# Patient Record
Sex: FEMALE | Race: WHITE | NOT HISPANIC OR LATINO | Employment: UNEMPLOYED | URBAN - METROPOLITAN AREA
[De-identification: names, ages, dates, MRNs, and addresses within clinical notes are randomized per-mention and may not be internally consistent; named-entity substitution may affect disease eponyms.]

---

## 2023-07-28 ENCOUNTER — HOSPITAL ENCOUNTER (EMERGENCY)
Facility: HOSPITAL | Age: 28
Discharge: HOME | End: 2023-07-28
Attending: EMERGENCY MEDICINE
Payer: MEDICAID

## 2023-07-28 VITALS
BODY MASS INDEX: 50.31 KG/M2 | RESPIRATION RATE: 18 BRPM | TEMPERATURE: 97.8 F | SYSTOLIC BLOOD PRESSURE: 132 MMHG | OXYGEN SATURATION: 96 % | HEART RATE: 87 BPM | DIASTOLIC BLOOD PRESSURE: 80 MMHG | WEIGHT: 283.95 LBS | HEIGHT: 63 IN

## 2023-07-28 DIAGNOSIS — N89.8 VAGINAL DISCHARGE: Primary | ICD-10-CM

## 2023-07-28 DIAGNOSIS — A59.9 TRICHIMONIASIS: ICD-10-CM

## 2023-07-28 LAB
B-HCG UR QL: NEGATIVE
C TRACH RRNA CVX QL NAA+PROBE: NOT DETECTED
C TRACH RRNA CVX QL NAA+PROBE: NOT DETECTED
CANDIDA SPECIES: NEGATIVE
GARDNERELLA VAGINALIS: NEGATIVE
N GONORRHOEA RRNA SPEC QL NAA+PROBE: NOT DETECTED
N GONORRHOEA RRNA SPEC QL NAA+PROBE: NOT DETECTED
T VAGINALIS DNA VAG QL PROBE+SIG AMP: POSITIVE

## 2023-07-28 PROCEDURE — 99283 EMERGENCY DEPT VISIT LOW MDM: CPT

## 2023-07-28 PROCEDURE — 87591 N.GONORRHOEAE DNA AMP PROB: CPT | Performed by: NURSE PRACTITIONER

## 2023-07-28 PROCEDURE — 25010000002 CEFTRIAXONE PER 250 MG: Performed by: NURSE PRACTITIONER

## 2023-07-28 PROCEDURE — 81025 URINE PREGNANCY TEST: CPT | Performed by: NURSE PRACTITIONER

## 2023-07-28 PROCEDURE — 96372 THER/PROPH/DIAG INJ SC/IM: CPT

## 2023-07-28 PROCEDURE — 87510 GARDNER VAG DNA DIR PROBE: CPT | Performed by: NURSE PRACTITIONER

## 2023-07-28 PROCEDURE — 87480 CANDIDA DNA DIR PROBE: CPT | Performed by: NURSE PRACTITIONER

## 2023-07-28 PROCEDURE — 0 LIDOCAINE 1 % SOLUTION 10 ML VIAL: Performed by: NURSE PRACTITIONER

## 2023-07-28 PROCEDURE — 87491 CHLMYD TRACH DNA AMP PROBE: CPT | Performed by: NURSE PRACTITIONER

## 2023-07-28 PROCEDURE — 87660 TRICHOMONAS VAGIN DIR PROBE: CPT | Performed by: NURSE PRACTITIONER

## 2023-07-28 RX ORDER — TRAZODONE HYDROCHLORIDE 50 MG/1
50 TABLET ORAL NIGHTLY
COMMUNITY
End: 2023-08-03

## 2023-07-28 RX ORDER — METRONIDAZOLE 500 MG/1
500 TABLET ORAL 3 TIMES DAILY
Qty: 21 TABLET | Refills: 0 | Status: ON HOLD | OUTPATIENT
Start: 2023-07-28

## 2023-07-28 RX ADMIN — LIDOCAINE HYDROCHLORIDE 500 MG: 10 INJECTION, SOLUTION INFILTRATION; PERINEURAL at 09:34

## 2023-07-28 NOTE — ED PROVIDER NOTES
Time: 11:29 AM EDT  Date of encounter:  7/28/2023  Independent Historian/Clinical History and Information was obtained by:   Patient    History is limited by: N/A    Chief Complaint: Vaginal discharge      History of Present Illness:  Patient is a 27 y.o. year old female who presents to the emergency department for evaluation of vaginal discharge that has been present for 2 weeks. She reports unprotected intercourse with a new partner about 3 weeks ago. She does not know if that individual had STI's.      History provided by:  Patient    Patient Care Team  Primary Care Provider: Provider, Meli Known    Past Medical History:     No Known Allergies  History reviewed. No pertinent past medical history.  History reviewed. No pertinent surgical history.  History reviewed. No pertinent family history.    Home Medications:  Prior to Admission medications    Medication Sig Start Date End Date Taking? Authorizing Provider   traZODone (DESYREL) 50 MG tablet Take 1 tablet by mouth Every Night.   Yes Provider, Historical, MD        Social History:   Social History     Tobacco Use    Smoking status: Every Day     Packs/day: 0.50     Years: 10.00     Pack years: 5.00     Types: Cigarettes    Smokeless tobacco: Never   Vaping Use    Vaping Use: Former   Substance Use Topics    Alcohol use: Never         Review of Systems:  Review of Systems   Constitutional:  Negative for chills and fever.   HENT:  Negative for congestion, ear pain and sore throat.    Eyes:  Negative for pain.   Respiratory:  Negative for cough, chest tightness and shortness of breath.    Cardiovascular:  Negative for chest pain.   Gastrointestinal:  Negative for abdominal pain, diarrhea, nausea and vomiting.   Genitourinary:  Positive for vaginal discharge. Negative for flank pain and hematuria.   Musculoskeletal:  Negative for joint swelling.   Skin:  Negative for pallor.   Neurological:  Negative for seizures and headaches.   All other systems reviewed and are  "negative.     Physical Exam:  /80 (BP Location: Left arm, Patient Position: Lying)   Pulse 87   Temp 97.8 °F (36.6 °C) (Oral)   Resp 18   Ht 160 cm (63\")   Wt 129 kg (283 lb 15.2 oz)   SpO2 96%   BMI 50.30 kg/m²     Physical Exam  Vitals and nursing note reviewed.   Constitutional:       General: She is not in acute distress.     Appearance: Normal appearance. She is not toxic-appearing.   HENT:      Head: Normocephalic and atraumatic.      Mouth/Throat:      Mouth: Mucous membranes are moist.   Eyes:      General: No scleral icterus.  Cardiovascular:      Rate and Rhythm: Normal rate and regular rhythm.      Pulses: Normal pulses.      Heart sounds: Normal heart sounds.   Pulmonary:      Effort: Pulmonary effort is normal. No respiratory distress.      Breath sounds: Normal breath sounds.   Abdominal:      General: Abdomen is flat.      Palpations: Abdomen is soft.      Tenderness: There is no abdominal tenderness.   Musculoskeletal:         General: Normal range of motion.      Cervical back: Normal range of motion and neck supple.   Skin:     General: Skin is warm and dry.   Neurological:      Mental Status: She is alert and oriented to person, place, and time. Mental status is at baseline.                Procedures:  Procedures      Medical Decision Making:      Comorbidities that affect care:        External Notes reviewed:          The following orders were placed and all results were independently analyzed by me:  Orders Placed This Encounter   Procedures    Chlamydia trachomatis, Neisseria gonorrhoeae, PCR - Urine, Urine, Random Void    Trichomonas vaginalis, PCR - Urine, Urine, Clean Catch    Gardnerella vaginalis, Trichomonas vaginalis, Candida albicans, DNA - Swab, Vagina    Chlamydia trachomatis, Neisseria gonorrhoeae, PCR - Swab, Cervix    Pregnancy, Urine - Urine, Clean Catch    Urinalysis With Microscopic If Indicated (No Culture) - Urine, Clean Catch       Medications Given in the " Emergency Department:  Medications   cefTRIAXone (ROCEPHIN) 250 mg/ml in lidocaine 1% IM syringe (500 mg vial) (500 mg Intramuscular Given 7/28/23 0934)        ED Course:         Labs:    Lab Results (last 24 hours)       Procedure Component Value Units Date/Time    Trichomonas vaginalis, PCR - Urine, Urine, Clean Catch [[id number]] Updated: 07/28/23 1206    Specimen: Urine, Clean Catch     Urinalysis With Microscopic If Indicated (No Culture) - Urine, Clean Catch [[id number]] Updated: 07/28/23 1205    Specimen: Urine, Clean Catch     Pregnancy, Urine - Urine, Clean Catch [[id number]]  (Normal) Collected: 07/28/23 0916    Specimen: Urine, Clean Catch Updated: 07/28/23 0954     HCG, Urine QL Negative    Narrative:      Sensitive immunoassays may demonstrate false positive results  with specimens containing heterophilic antibodies. Assays may  also exhibit false-positive or false-negative results with  specimens containing human anti-mouse antibodies. These   specimens may come from patients receving preparations of  mouse monoclonal antibodies for diagnosis or therapy or having  been exposed to mice. If the qualitative interpretation is  inconsistent with the clinical evaluation, results should be   confirmed by an alternate hCG method, ie. quantitative hCG.  As with all urine pregnancy test, this test does not reliably  detect hCG degradation products, including free-beta hCG and  beta core fragments.    Chlamydia trachomatis, Neisseria gonorrhoeae, PCR - Urine, Urine, Random Void [[id number]]  (Normal) Collected: 07/28/23 1027    Specimen: Urine, Random Void Updated: 07/28/23 1212     Chlamydia DNA by PCR Not Detected     Neisseria gonorrhoeae by PCR Not Detected    Gardnerella vaginalis, Trichomonas vaginalis, Candida albicans, DNA - Swab, Vagina [[id number]]  (Abnormal) Collected: 07/28/23 1246    Specimen: Swab from Vagina Updated: 07/28/23 1401     GARDNERELLA VAGINALIS Negative     TRICHOMONAS VAGINALIS Positive      CANDIDA SPECIES Negative    Chlamydia trachomatis, Neisseria gonorrhoeae, PCR - Swab, Cervix [[id number]]  (Normal) Collected: 07/28/23 1246    Specimen: Swab from Cervix Updated: 07/28/23 1431     Chlamydia DNA by PCR Not Detected     Neisseria gonorrhoeae by PCR Not Detected             Imaging:    No Radiology Exams Resulted Within Past 24 Hours      Differential Diagnosis and Discussion:    Vaginal Discharge: Differential diagnosis includes but is not limited to bacterial vaginosis, candidiasis, trichomonas vaginitis, cervicitis, rectovaginal fistula, irritants and allergens, foreign body, pelvic inflammatory disease.    All labs were reviewed and interpreted by me.    MDM  Number of Diagnoses or Management Options  Trichimoniasis  Vaginal discharge  Diagnosis management comments: Delay of dispo due to first urine specimen was spilled by pt, lab had just enough to perform urine pregnancy. Pt did not want to self swab initially. 2nd urine sample was sent and nurse reports lab error occurred processing the sample. Swab ordered then. Due to pt symptoms and recent unprotected sex a Rocephin injection was ordered prior to results and pt was going to be discharged prior to test results, pt reports she prefers to wait for results prior to discharge.        Amount and/or Complexity of Data Reviewed  Clinical lab tests: reviewed             Patient Care Considerations:          Consultants/Shared Management Plan:    None    Social Determinants of Health:    Patient is independent, reliable, and has access to care.       Disposition and Care Coordination:    Discharged: The patient is suitable and stable for discharge with no need for consideration of observation or admission.    I have explained the patient´s condition, diagnoses and treatment plan based on the information available to me at this time. I have answered questions and addressed any concerns. The patient has a good  understanding of the patient´s diagnosis,  condition, and treatment plan as can be expected at this point. The vital signs have been stable. The patient´s condition is stable and appropriate for discharge from the emergency department.      The patient will pursue further outpatient evaluation with the primary care physician or other designated or consulting physician as outlined in the discharge instructions. They are agreeable to this plan of care and follow-up instructions have been explained in detail. The patient has received these instructions in written format and have expressed an understanding of the discharge instructions. The patient is aware that any significant change in condition or worsening of symptoms should prompt an immediate return to this or the closest emergency department or call to 911.    Final diagnoses:   Vaginal discharge   Trichimoniasis        ED Disposition       ED Disposition   Discharge    Condition   Stable    Comment   --               This medical record created using voice recognition software.             Adiel Ayala, APRN  07/28/23 1508

## 2023-08-03 ENCOUNTER — HOSPITAL ENCOUNTER (INPATIENT)
Facility: HOSPITAL | Age: 28
LOS: 5 days | Discharge: HOME | DRG: 885 | End: 2023-08-08
Attending: PSYCHIATRY & NEUROLOGY | Admitting: PSYCHIATRY & NEUROLOGY
Payer: MEDICAID

## 2023-08-03 ENCOUNTER — HOSPITAL ENCOUNTER (EMERGENCY)
Facility: HOSPITAL | Age: 28
Discharge: PSYCHIATRIC OR SUBSTANCE USE FACILITY | DRG: 885 | End: 2023-08-03
Attending: EMERGENCY MEDICINE
Payer: MEDICAID

## 2023-08-03 VITALS
BODY MASS INDEX: 48.18 KG/M2 | TEMPERATURE: 98.4 F | DIASTOLIC BLOOD PRESSURE: 85 MMHG | HEART RATE: 94 BPM | OXYGEN SATURATION: 97 % | HEIGHT: 64 IN | WEIGHT: 282.19 LBS | SYSTOLIC BLOOD PRESSURE: 136 MMHG | RESPIRATION RATE: 19 BRPM

## 2023-08-03 DIAGNOSIS — R45.851 SUICIDAL IDEATION: Primary | ICD-10-CM

## 2023-08-03 PROBLEM — F33.1 MAJOR DEPRESSIVE DISORDER, RECURRENT EPISODE, MODERATE: Status: ACTIVE | Noted: 2023-08-03

## 2023-08-03 LAB
ALBUMIN SERPL-MCNC: 3.9 G/DL (ref 3.5–5.2)
ALBUMIN/GLOB SERPL: 1.3 G/DL
ALP SERPL-CCNC: 80 U/L (ref 39–117)
ALT SERPL W P-5'-P-CCNC: 51 U/L (ref 1–33)
AMPHET+METHAMPHET UR QL: NEGATIVE
ANION GAP SERPL CALCULATED.3IONS-SCNC: 12.8 MMOL/L (ref 5–15)
APAP SERPL-MCNC: <5 MCG/ML (ref 0–30)
AST SERPL-CCNC: 34 U/L (ref 1–32)
B-HCG UR QL: NEGATIVE
BARBITURATES UR QL SCN: NEGATIVE
BASOPHILS # BLD AUTO: 0.03 10*3/MM3 (ref 0–0.2)
BASOPHILS NFR BLD AUTO: 0.3 % (ref 0–1.5)
BENZODIAZ UR QL SCN: NEGATIVE
BILIRUB SERPL-MCNC: 0.2 MG/DL (ref 0–1.2)
BUN SERPL-MCNC: 14 MG/DL (ref 6–20)
BUN/CREAT SERPL: 15.4 (ref 7–25)
CALCIUM SPEC-SCNC: 9.9 MG/DL (ref 8.6–10.5)
CANNABINOIDS SERPL QL: NEGATIVE
CHLORIDE SERPL-SCNC: 104 MMOL/L (ref 98–107)
CO2 SERPL-SCNC: 21.2 MMOL/L (ref 22–29)
COCAINE UR QL: NEGATIVE
CREAT SERPL-MCNC: 0.91 MG/DL (ref 0.57–1)
DEPRECATED RDW RBC AUTO: 45.9 FL (ref 37–54)
EGFRCR SERPLBLD CKD-EPI 2021: 88.9 ML/MIN/1.73
EOSINOPHIL # BLD AUTO: 0.09 10*3/MM3 (ref 0–0.4)
EOSINOPHIL NFR BLD AUTO: 1 % (ref 0.3–6.2)
ERYTHROCYTE [DISTWIDTH] IN BLOOD BY AUTOMATED COUNT: 13.3 % (ref 12.3–15.4)
ETHANOL BLD-MCNC: <10 MG/DL (ref 0–10)
ETHANOL UR QL: <0.01 %
FENTANYL UR-MCNC: NEGATIVE NG/ML
GLOBULIN UR ELPH-MCNC: 3.1 GM/DL
GLUCOSE SERPL-MCNC: 130 MG/DL (ref 65–99)
HCT VFR BLD AUTO: 48.8 % (ref 34–46.6)
HGB BLD-MCNC: 14.8 G/DL (ref 12–15.9)
HOLD SPECIMEN: NORMAL
HOLD SPECIMEN: NORMAL
IMM GRANULOCYTES # BLD AUTO: 0.03 10*3/MM3 (ref 0–0.05)
IMM GRANULOCYTES NFR BLD AUTO: 0.3 % (ref 0–0.5)
LYMPHOCYTES # BLD AUTO: 3.16 10*3/MM3 (ref 0.7–3.1)
LYMPHOCYTES NFR BLD AUTO: 34.6 % (ref 19.6–45.3)
MCH RBC QN AUTO: 28.4 PG (ref 26.6–33)
MCHC RBC AUTO-ENTMCNC: 30.3 G/DL (ref 31.5–35.7)
MCV RBC AUTO: 93.7 FL (ref 79–97)
METHADONE UR QL SCN: NEGATIVE
MONOCYTES # BLD AUTO: 0.6 10*3/MM3 (ref 0.1–0.9)
MONOCYTES NFR BLD AUTO: 6.6 % (ref 5–12)
NEUTROPHILS NFR BLD AUTO: 5.21 10*3/MM3 (ref 1.7–7)
NEUTROPHILS NFR BLD AUTO: 57.2 % (ref 42.7–76)
NRBC BLD AUTO-RTO: 0 /100 WBC (ref 0–0.2)
OPIATES UR QL: NEGATIVE
OXYCODONE UR QL SCN: NEGATIVE
PLATELET # BLD AUTO: 258 10*3/MM3 (ref 140–450)
PMV BLD AUTO: 10.1 FL (ref 6–12)
POTASSIUM SERPL-SCNC: 4.4 MMOL/L (ref 3.5–5.2)
PROT SERPL-MCNC: 7 G/DL (ref 6–8.5)
RBC # BLD AUTO: 5.21 10*6/MM3 (ref 3.77–5.28)
SALICYLATES SERPL-MCNC: <0.3 MG/DL
SODIUM SERPL-SCNC: 138 MMOL/L (ref 136–145)
WBC NRBC COR # BLD: 9.12 10*3/MM3 (ref 3.4–10.8)
WHOLE BLOOD HOLD COAG: NORMAL
WHOLE BLOOD HOLD SPECIMEN: NORMAL

## 2023-08-03 PROCEDURE — 99284 EMERGENCY DEPT VISIT MOD MDM: CPT

## 2023-08-03 PROCEDURE — 82077 ASSAY SPEC XCP UR&BREATH IA: CPT | Performed by: EMERGENCY MEDICINE

## 2023-08-03 PROCEDURE — 80053 COMPREHEN METABOLIC PANEL: CPT | Performed by: EMERGENCY MEDICINE

## 2023-08-03 PROCEDURE — 80307 DRUG TEST PRSMV CHEM ANLYZR: CPT | Performed by: EMERGENCY MEDICINE

## 2023-08-03 PROCEDURE — 80179 DRUG ASSAY SALICYLATE: CPT | Performed by: EMERGENCY MEDICINE

## 2023-08-03 PROCEDURE — 81025 URINE PREGNANCY TEST: CPT | Performed by: PSYCHIATRY & NEUROLOGY

## 2023-08-03 PROCEDURE — 80143 DRUG ASSAY ACETAMINOPHEN: CPT | Performed by: EMERGENCY MEDICINE

## 2023-08-03 PROCEDURE — 36415 COLL VENOUS BLD VENIPUNCTURE: CPT

## 2023-08-03 PROCEDURE — 85025 COMPLETE CBC W/AUTO DIFF WBC: CPT | Performed by: EMERGENCY MEDICINE

## 2023-08-03 RX ORDER — HYDROXYZINE PAMOATE 50 MG/1
50 CAPSULE ORAL EVERY 6 HOURS PRN
Status: DISCONTINUED | OUTPATIENT
Start: 2023-08-03 | End: 2023-08-08 | Stop reason: HOSPADM

## 2023-08-03 RX ORDER — RISPERIDONE 2 MG/1
2 TABLET ORAL DAILY
COMMUNITY
Start: 2023-07-28 | End: 2023-08-08 | Stop reason: HOSPADM

## 2023-08-03 RX ORDER — IBUPROFEN 200 MG
400 TABLET ORAL EVERY 6 HOURS PRN
COMMUNITY
End: 2023-08-08 | Stop reason: HOSPADM

## 2023-08-03 RX ORDER — ACETAMINOPHEN 325 MG/1
650 TABLET ORAL EVERY 4 HOURS PRN
Status: DISCONTINUED | OUTPATIENT
Start: 2023-08-03 | End: 2023-08-08 | Stop reason: HOSPADM

## 2023-08-03 RX ORDER — BUSPIRONE HYDROCHLORIDE 10 MG/1
10 TABLET ORAL 3 TIMES DAILY
COMMUNITY
Start: 2023-07-28 | End: 2023-08-08 | Stop reason: HOSPADM

## 2023-08-03 RX ORDER — DIPHENHYDRAMINE HYDROCHLORIDE 50 MG/ML
50 INJECTION INTRAMUSCULAR; INTRAVENOUS EVERY 4 HOURS PRN
Status: DISCONTINUED | OUTPATIENT
Start: 2023-08-03 | End: 2023-08-08 | Stop reason: HOSPADM

## 2023-08-03 RX ORDER — LOPERAMIDE HYDROCHLORIDE 2 MG/1
2 CAPSULE ORAL
Status: DISCONTINUED | OUTPATIENT
Start: 2023-08-03 | End: 2023-08-08 | Stop reason: HOSPADM

## 2023-08-03 RX ORDER — LORAZEPAM 2 MG/1
2 TABLET ORAL EVERY 4 HOURS PRN
Status: DISCONTINUED | OUTPATIENT
Start: 2023-08-03 | End: 2023-08-08 | Stop reason: HOSPADM

## 2023-08-03 RX ORDER — ALUMINA, MAGNESIA, AND SIMETHICONE 2400; 2400; 240 MG/30ML; MG/30ML; MG/30ML
15 SUSPENSION ORAL EVERY 6 HOURS PRN
Status: DISCONTINUED | OUTPATIENT
Start: 2023-08-03 | End: 2023-08-08 | Stop reason: HOSPADM

## 2023-08-03 RX ORDER — PRAZOSIN HYDROCHLORIDE 1 MG/1
1 CAPSULE ORAL NIGHTLY
COMMUNITY
Start: 2023-07-28

## 2023-08-03 RX ORDER — HALOPERIDOL 5 MG/1
5 TABLET ORAL EVERY 4 HOURS PRN
Status: DISCONTINUED | OUTPATIENT
Start: 2023-08-03 | End: 2023-08-08 | Stop reason: HOSPADM

## 2023-08-03 RX ORDER — HALOPERIDOL 5 MG/ML
5 INJECTION INTRAMUSCULAR EVERY 4 HOURS PRN
Status: DISCONTINUED | OUTPATIENT
Start: 2023-08-03 | End: 2023-08-08 | Stop reason: HOSPADM

## 2023-08-03 RX ORDER — LORAZEPAM 2 MG/ML
2 INJECTION INTRAMUSCULAR EVERY 4 HOURS PRN
Status: DISCONTINUED | OUTPATIENT
Start: 2023-08-03 | End: 2023-08-08 | Stop reason: HOSPADM

## 2023-08-03 RX ORDER — NICOTINE 21 MG/24HR
1 PATCH, TRANSDERMAL 24 HOURS TRANSDERMAL DAILY PRN
Status: DISCONTINUED | OUTPATIENT
Start: 2023-08-03 | End: 2023-08-05

## 2023-08-03 RX ORDER — DIPHENHYDRAMINE HCL 50 MG
50 CAPSULE ORAL EVERY 4 HOURS PRN
Status: DISCONTINUED | OUTPATIENT
Start: 2023-08-03 | End: 2023-08-08 | Stop reason: HOSPADM

## 2023-08-03 RX ORDER — TRAZODONE HYDROCHLORIDE 50 MG/1
100 TABLET ORAL NIGHTLY PRN
Status: DISCONTINUED | OUTPATIENT
Start: 2023-08-03 | End: 2023-08-05

## 2023-08-03 RX ORDER — SODIUM CHLORIDE 0.9 % (FLUSH) 0.9 %
10 SYRINGE (ML) INJECTION AS NEEDED
Status: DISCONTINUED | OUTPATIENT
Start: 2023-08-03 | End: 2023-08-03 | Stop reason: HOSPADM

## 2023-08-04 PROBLEM — A59.00 UROGENITAL INFECTION BY TRICHOMONAS VAGINALIS: Status: ACTIVE | Noted: 2023-08-04

## 2023-08-04 PROBLEM — R41.83 BORDERLINE INTELLECTUAL FUNCTIONING: Status: ACTIVE | Noted: 2023-08-04

## 2023-08-04 PROBLEM — E66.01 CLASS 3 SEVERE OBESITY WITH BODY MASS INDEX (BMI) OF 45.0 TO 49.9 IN ADULT: Status: ACTIVE | Noted: 2023-08-04

## 2023-08-04 PROBLEM — F43.10 POST TRAUMATIC STRESS DISORDER (PTSD): Status: ACTIVE | Noted: 2023-08-04

## 2023-08-04 PROBLEM — F15.10 METHAMPHETAMINE ABUSE: Status: ACTIVE | Noted: 2023-08-04

## 2023-08-04 PROBLEM — F10.10 ALCOHOL ABUSE: Status: ACTIVE | Noted: 2023-08-04

## 2023-08-04 RX ORDER — PRAZOSIN HYDROCHLORIDE 1 MG/1
1 CAPSULE ORAL NIGHTLY
Status: DISCONTINUED | OUTPATIENT
Start: 2023-08-04 | End: 2023-08-08 | Stop reason: HOSPADM

## 2023-08-04 RX ORDER — VENLAFAXINE HYDROCHLORIDE 75 MG/1
75 CAPSULE, EXTENDED RELEASE ORAL
Status: DISCONTINUED | OUTPATIENT
Start: 2023-08-04 | End: 2023-08-08 | Stop reason: HOSPADM

## 2023-08-04 RX ORDER — METRONIDAZOLE 500 MG/1
500 TABLET ORAL 3 TIMES DAILY
Status: DISCONTINUED | OUTPATIENT
Start: 2023-08-04 | End: 2023-08-08 | Stop reason: HOSPADM

## 2023-08-04 RX ADMIN — METRONIDAZOLE 500 MG: 500 TABLET ORAL at 10:10

## 2023-08-04 RX ADMIN — NICOTINE 1 PATCH: 21 PATCH, EXTENDED RELEASE TRANSDERMAL at 16:13

## 2023-08-04 RX ADMIN — PRAZOSIN HYDROCHLORIDE 1 MG: 1 CAPSULE ORAL at 20:40

## 2023-08-04 RX ADMIN — METRONIDAZOLE 500 MG: 500 TABLET ORAL at 16:15

## 2023-08-04 RX ADMIN — METRONIDAZOLE 500 MG: 500 TABLET ORAL at 20:40

## 2023-08-04 RX ADMIN — VENLAFAXINE HYDROCHLORIDE 75 MG: 75 CAPSULE, EXTENDED RELEASE ORAL at 10:52
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
## 2023-08-04 NOTE — PLAN OF CARE
Goal Outcome Evaluation:   PATIENT ALERT AND ORIENTED AND COOPERATIVE WITH STAFF. PATIENT COMPLIANT WITH MEDICATIONS. PATIENT DENIES S/I, H/I OR HALLUCINATIONS. PATIENT DENIES ANY ANXIETY OR DEPRESSION . PATIENT HAS BEEN SLEEPING IN BED MOST OF THE DAY. NO INAPPROPRIATE OR AGGRESSIVE BEHAVIOR NOTED. WILL CONTINUE TO MONITOR FOR CHANGES IN MOOD OR BEHAVIOR.

## 2023-08-04 NOTE — SIGNIFICANT NOTE
08/04/23 1448   Plan   Plan [facility] confirmed pt can return to treatment when stabilized.   Patient/Family in Agreement with Plan yes   Plan Comments Contact Vania at [phone] to [facility]   Final Discharge Disposition Code 30 - still a patient
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
## 2023-08-04 NOTE — ED PROVIDER NOTES
Time: 10:15 PM EDT  Date of encounter:  8/3/2023  Independent Historian/Clinical History and Information was obtained by:   Patient    History is limited by: N/A    Chief Complaint: Suicidal ideation      History of Present Illness:  Patient is a 27 y.o. year old female who presents to the emergency department for evaluation of suicidal ideation.  [facility] and presents today for suicidal ideation after other patients there are making fun of her.  She feels like slitting her wrist or overdosing.  Denies any medical complaints and denies any recent attempt to harm herself.    HPI    Patient Care Team  Primary Care Provider: Provider, Meli Known    Past Medical History:     No Known Allergies  Past Medical History:   Diagnosis Date    Anxiety     Borderline personality disorder     Depression     Fibromyalgia     Obesity     PTSD (post-traumatic stress disorder)      History reviewed. No pertinent surgical history.  History reviewed. No pertinent family history.    Home Medications:  Prior to Admission medications    Medication Sig Start Date End Date Taking? Authorizing Provider   busPIRone (BUSPAR) 10 MG tablet Take 1 tablet by mouth 3 (Three) Times a Day. 7/28/23  Yes Yuri Ivan MD   prazosin (MINIPRESS) 1 MG capsule Take 1 capsule by mouth Every Night. 7/28/23  Yes Yuri Ivan MD   risperiDONE (risperDAL) 2 MG tablet Take 1 tablet by mouth Daily. 7/28/23  Yes Yuri Ivan MD   sertraline (ZOLOFT) 50 MG tablet Take 1 tablet by mouth Daily. 7/28/23  Yes Yuri Ivan MD   ibuprofen (ADVIL,MOTRIN) 200 MG tablet Take 2 tablets by mouth Every 6 (Six) Hours As Needed.    ProviderYuri MD   metroNIDAZOLE (FLAGYL) 500 MG tablet Take 1 tablet by mouth 3 (Three) Times a Day. 7/28/23   Adiel Ayala APRN   traZODone (DESYREL) 50 MG tablet Take 1 tablet by mouth Every Night.  8/3/23  Yuri Ivan MD        Social History:   Social  "History     Tobacco Use    Smoking status: Every Day     Packs/day: 0.50     Years: 10.00     Pack years: 5.00     Types: Cigarettes    Smokeless tobacco: Never   Vaping Use    Vaping Use: Former   Substance Use Topics    Alcohol use: Not Currently    Drug use: Not Currently     Types: Methamphetamines         Review of Systems:  Review of Systems   Constitutional:  Negative for chills and fever.   HENT:  Negative for congestion, rhinorrhea and sore throat.    Eyes:  Negative for photophobia.   Respiratory:  Negative for apnea, cough, chest tightness and shortness of breath.    Cardiovascular:  Negative for chest pain and palpitations.   Gastrointestinal:  Negative for abdominal pain, diarrhea, nausea and vomiting.   Endocrine: Negative.    Genitourinary:  Negative for difficulty urinating and dysuria.   Musculoskeletal:  Negative for back pain, joint swelling and myalgias.   Skin:  Negative for color change and wound.   Allergic/Immunologic: Negative.    Neurological:  Negative for seizures and headaches.   Psychiatric/Behavioral: Negative.  Positive for suicidal ideas.    All other systems reviewed and are negative.     Physical Exam:  /85   Pulse 94   Temp 98.4 øF (36.9 øC) (Oral)   Resp 19   Ht 162.6 cm (64\")   Wt 128 kg (282 lb 3 oz)   SpO2 97%   BMI 48.44 kg/mý     Physical Exam  Vitals and nursing note reviewed.   Constitutional:       General: She is awake.      Appearance: Normal appearance.   HENT:      Head: Normocephalic and atraumatic.      Nose: Nose normal.      Mouth/Throat:      Mouth: Mucous membranes are moist.   Eyes:      Extraocular Movements: Extraocular movements intact.      Pupils: Pupils are equal, round, and reactive to light.   Cardiovascular:      Rate and Rhythm: Normal rate and regular rhythm.      Heart sounds: Normal heart sounds.   Pulmonary:      Effort: Pulmonary effort is normal. No respiratory distress.      Breath sounds: Normal breath sounds. No wheezing, rhonchi " or rales.   Abdominal:      General: Bowel sounds are normal.      Palpations: Abdomen is soft.      Tenderness: There is no abdominal tenderness. There is no guarding or rebound.      Comments: No rigidity   Musculoskeletal:         General: No tenderness. Normal range of motion.      Cervical back: Normal range of motion and neck supple.   Skin:     General: Skin is warm and dry.      Coloration: Skin is not jaundiced.   Neurological:      General: No focal deficit present.      Mental Status: She is alert and oriented to person, place, and time. Mental status is at baseline.      Sensory: Sensation is intact.      Motor: Motor function is intact.      Coordination: Coordination is intact.   Psychiatric:         Attention and Perception: Attention and perception normal.         Mood and Affect: Mood and affect normal.         Speech: Speech normal.         Behavior: Behavior normal.         Judgment: Judgment normal.                Procedures:  Procedures      Medical Decision Making:      Comorbidities that affect care:    Psychiatric illness    External Notes reviewed:    None      The following orders were placed and all results were independently analyzed by me:  Orders Placed This Encounter   Procedures    [place] Draw    Comprehensive Metabolic Panel    Acetaminophen Level    Ethanol    Salicylate Level    Urine Drug Screen - Urine, Clean Catch    CBC Auto Differential    NPO Diet NPO Type: Strict NPO    Continuous Pulse Oximetry    Vital Signs    Undress & Gown    Psych / Access to See    IP General Consult (Use specialty-specific consult if known)    Oxygen Therapy- Nasal Cannula; Titrate 1-6 LPM Per SpO2; 90 - 95%    POC Glucose Once    Insert Peripheral IV    Suicide Precautions    CBC & Differential    Green Top (Gel)    Lavender Top    Gold Top - SST    Light Blue Top       Medications Given in the Emergency Department:  Medications   sodium chloride 0.9 % flush 10 mL (has no administration in time  range)        ED Course:         Labs:    Lab Results (last 24 hours)       Procedure Component Value Units Date/Time    CBC & Differential [[id number]]  (Abnormal) Collected: 08/03/23 2001    Specimen: Blood from Arm, Right Updated: 08/03/23 2015    Narrative:      The following orders were created for panel order CBC & Differential.  Procedure                               Abnormality         Status                     ---------                               -----------         ------                     CBC Auto Differential[[id number]]        Abnormal            Final result                 Please view results for these tests on the individual orders.    Comprehensive Metabolic Panel [[id number]]  (Abnormal) Collected: 08/03/23 2001    Specimen: Blood from Arm, Right Updated: 08/03/23 2040     Glucose 130 mg/dL      BUN 14 mg/dL      Creatinine 0.91 mg/dL      Sodium 138 mmol/L      Potassium 4.4 mmol/L      Comment: Slight hemolysis detected by analyzer. Results may be affected.        Chloride 104 mmol/L      CO2 21.2 mmol/L      Calcium 9.9 mg/dL      Total Protein 7.0 g/dL      Albumin 3.9 g/dL      ALT (SGPT) 51 U/L      AST (SGOT) 34 U/L      Comment: Slight hemolysis detected by analyzer. Results may be affected.        Alkaline Phosphatase 80 U/L      Total Bilirubin 0.2 mg/dL      Globulin 3.1 gm/dL      A/G Ratio 1.3 g/dL      BUN/Creatinine Ratio 15.4     Anion Gap 12.8 mmol/L      eGFR 88.9 mL/min/1.73     Narrative:      GFR Normal >60  Chronic Kidney Disease <60  Kidney Failure <15      Acetaminophen Level [[id number]]  (Normal) Collected: 08/03/23 2001    Specimen: Blood from Arm, Right Updated: 08/03/23 2039     Acetaminophen <5.0 mcg/mL     Ethanol [[id number]] Collected: 08/03/23 2001    Specimen: Blood from Arm, Right Updated: 08/03/23 2039     Ethanol <10 mg/dL      Ethanol % <0.010 %     Narrative:      Ethanol (Plasma)  <10 Essentially Negative    Toxic Concentrations            mg/dL    Flushing, slowing of reflexes    Impaired visual activity         Depression of CNS              >100  Possible Coma                  >300       Salicylate Level [[id number]]  (Normal) Collected: 08/03/23 2001    Specimen: Blood from Arm, Right Updated: 08/03/23 2039     Salicylate <0.3 mg/dL     CBC Auto Differential [[id number]]  (Abnormal) Collected: 08/03/23 2001    Specimen: Blood from Arm, Right Updated: 08/03/23 2015     WBC 9.12 10*3/mm3      RBC 5.21 10*6/mm3      Hemoglobin 14.8 g/dL      Hematocrit 48.8 %      MCV 93.7 fL      MCH 28.4 pg      MCHC 30.3 g/dL      RDW 13.3 %      RDW-SD 45.9 fl      MPV 10.1 fL      Platelets 258 10*3/mm3      Neutrophil % 57.2 %      Lymphocyte % 34.6 %      Monocyte % 6.6 %      Eosinophil % 1.0 %      Basophil % 0.3 %      Immature Grans % 0.3 %      Neutrophils, Absolute 5.21 10*3/mm3      Lymphocytes, Absolute 3.16 10*3/mm3      Monocytes, Absolute 0.60 10*3/mm3      Eosinophils, Absolute 0.09 10*3/mm3      Basophils, Absolute 0.03 10*3/mm3      Immature Grans, Absolute 0.03 10*3/mm3      nRBC 0.0 /100 WBC     Urine Drug Screen - Urine, Clean Catch [[id number]]  (Normal) Collected: 08/03/23 2005    Specimen: Urine, Clean Catch Updated: 08/03/23 2034     Amphet/Methamphet, Screen Negative     Barbiturates Screen, Urine Negative     Benzodiazepine Screen, Urine Negative     Cocaine Screen, Urine Negative     Opiate Screen Negative     THC, Screen, Urine Negative     Methadone Screen, Urine Negative     Oxycodone Screen, Urine Negative     Fentanyl, Urine Negative    Narrative:      Negative Thresholds Per Drugs Screened:    Amphetamines                 500 ng/ml  Barbiturates                 200 ng/ml  Benzodiazepines              100 ng/ml  Cocaine                      300 ng/ml  Methadone                    300 ng/ml  Opiates                      300 ng/ml  Oxycodone                    100 ng/ml  THC                           50 ng/ml  Fentanyl                        5 ng/ml      The Normal Value for all drugs tested is negative. This report includes final unconfirmed screening results to be used for medical treatment purposes only. Unconfirmed results must not be used for non-medical purposes such as employment or legal testing. Clinical consideration should be applied to any drug of abuse test, particularly when unconfirmed results are used.            Pregnancy, Urine - Urine, Clean Catch [[id number]]  (Normal) Collected: 08/03/23 2005    Specimen: Urine, Clean Catch Updated: 08/03/23 2144     HCG, Urine QL Negative    Narrative:      Sensitive immunoassays may demonstrate false positive results  with specimens containing heterophilic antibodies. Assays may  also exhibit false-positive or false-negative results with  specimens containing human anti-mouse antibodies. These   specimens may come from patients receving preparations of  mouse monoclonal antibodies for diagnosis or therapy or having  been exposed to mice. If the qualitative interpretation is  inconsistent with the clinical evaluation, results should be   confirmed by an alternate hCG method, ie. quantitative hCG.  As with all urine pregnancy test, this test does not reliably  detect hCG degradation products, including free-beta hCG and  beta core fragments.             Imaging:    No Radiology Exams Resulted Within Past 24 Hours      Differential Diagnosis and Discussion:    Psychiatric: Differential diagnosis includes but is not limited to depression, psychosis, bipolar disorder, anxiety, manic episode, schizophrenia, and substance abuse.    All labs were reviewed and interpreted by me.    [facility]           Patient Care Considerations:          Consultants/Shared Management Plan:    Hospitalist: I have discussed the case with psychiatry on-call, Dr. Amaya who agrees to accept the patient for admission.    Social Determinants of Health:    Patient is independent, reliable, and has access to care.        Disposition and Care Coordination:    Psychiatric Admission: Through independent evaluation of the patient's history and physical and consultation with psychiatry, the patient meets criteria for admission to a psychiatric facility.        Final diagnoses:   Suicidal ideation        ED Disposition       ED Disposition   DC/Transfer to Behavioral Health    Condition   Stable    Comment   --               This medical record created using voice recognition software.             Moise Jordan MD  08/03/23 6225

## 2023-08-04 NOTE — SIGNIFICANT NOTE
08/04/23 1247   Plan   Plan Contact with [facility] to confirm pt readmission to treatment. Pending confirmation   Patient/Family in Agreement with Plan yes   Final Discharge Disposition Code 30 - still a patient

## 2023-08-05 RX ORDER — POLYETHYLENE GLYCOL 3350 17 G
2 POWDER IN PACKET (EA) ORAL
Status: DISCONTINUED | OUTPATIENT
Start: 2023-08-05 | End: 2023-08-08 | Stop reason: HOSPADM

## 2023-08-05 RX ADMIN — NICOTINE POLACRILEX 2 MG: 2 LOZENGE ORAL at 21:07

## 2023-08-05 RX ADMIN — METRONIDAZOLE 500 MG: 500 TABLET ORAL at 16:05

## 2023-08-05 RX ADMIN — HYDROXYZINE PAMOATE 75 MG: 25 CAPSULE ORAL at 21:06

## 2023-08-05 RX ADMIN — VENLAFAXINE HYDROCHLORIDE 75 MG: 75 CAPSULE, EXTENDED RELEASE ORAL at 09:02

## 2023-08-05 RX ADMIN — NICOTINE POLACRILEX 2 MG: 2 LOZENGE ORAL at 17:58

## 2023-08-05 RX ADMIN — METRONIDAZOLE 500 MG: 500 TABLET ORAL at 21:06

## 2023-08-05 RX ADMIN — ALUMINUM HYDROXIDE, MAGNESIUM HYDROXIDE, AND DIMETHICONE 15 ML: 400; 400; 40 SUSPENSION ORAL at 23:35

## 2023-08-05 RX ADMIN — PRAZOSIN HYDROCHLORIDE 1 MG: 1 CAPSULE ORAL at 21:07

## 2023-08-05 RX ADMIN — METRONIDAZOLE 500 MG: 500 TABLET ORAL at 09:02

## 2023-08-05 NOTE — PROGRESS NOTES
" [facility]     Psychiatric Progress Note    Patient Name: Cristina Estevez  : 1995  MRN: [id number]  Primary Care Physician:  Provider, No Known  Date of admission: 8/3/2023    Subjective   Subjective     Patient seen and chart reviewed, discussed with staff.    Chief Complaint: Depression, suicidal ideations      HPI:     Staff reports the patient was withdrawn and in bed all day yesterday.  She apparently was asking for assistance going to the restroom, and was acting somewhat helpless and needy.  Rated her depression as an 8.  Staff reports that she slept well.  She is denying suicidal homicidal ideation.    Patient today tells me that she is feeling tired.  She reports that she had decreased sleep last night, but admits that she \"slept almost all day yesterday.\"  Discussed that the time she is spending in bed will interfere with sleep at night.  Encouraged her to get up and be out and about the milieu.  Patient then goes on to ask about sleep medicine and states that she cannot take trazodone because it keeps her awake at night.  She did not require any medicines for sleep last night and did not take the trazodone.  Patient tells me she has no suicidal ideations today and when asked what is different or better if she responds, \"I am just not thinking about it.\"      Objective   Objective     Vitals:   Temp:  [98.1 øF (36.7 øC)-98.6 øF (37 øC)] 98.6 øF (37 øC)  Heart Rate:  [] 99  Resp:  [18-20] 18  BP: (101-132)/() 101/68          Mental Status Exam:      Appearance:   Well-developed, well-nourished, bed, isolative  Reliability:   Limited  Eye Contact:   Limited  Concentration/Focus:    Attentive to the interview but participates minimally  Behaviors:    No restlessness or agitation  Memory :    Intact  Speech:    Normal rate and volume  Language:   Appropriate, relevant  Mood :    \"Okay\"  Affect:    Blunted  Thought process:    Goal directed, no paranoia or delusions elicited  Thought " Content:    Denies suicidal ideations this morning, no homicidal ideations, no auditory visualizations  Insight:   Limited  Judgement:    Impaired, staying in bed all day, withdrawn, but no behavioral disturbance      Result Review    Result Review:  I have personally reviewed the results from the time of this admission to 8/5/2023 12:03 EDT and agree with these findings:  []  Laboratory  []  Microbiology  []  Radiology  []  EKG/Telemetry   []  Cardiology/Vascular   []  Pathology  []  Old records  []  Other:  Most notable findings include:     Lab Results (last 24 hours)       ** No results found for the last 24 hours. **                Medications:   metroNIDAZOLE, 500 mg, Oral, TID  prazosin, 1 mg, Oral, Nightly  venlafaxine XR, 75 mg, Oral, Daily With Breakfast          Assessment / Plan       Active Hospital Problems:  Active Hospital Problems    Diagnosis     **Major depressive disorder, recurrent episode, moderate     Post traumatic stress disorder (PTSD)     Alcohol abuse     Methamphetamine abuse     Class 3 severe obesity with body mass index (BMI) of 45.0 to 49.9 in adult     Borderline intellectual functioning     Urogenital infection by trichomonas vaginalis        Plan:     Continue current treatment protocol and titrate medications as clinically indicated   work on mood stabilization and abatement of any suicidal ideation or psychosis.  Work on appropriate safety plan  Continue supportive therapy  Patient to engage in all group and individual treatment modalities available on the unit  Obtain collateral information if possible  Titrate medications as clinically indicated  Work on appropriate disposition follow-up including referrals to substance abuse treatment if indicated      Disposition:  I expect patient to be discharged 2 to 3 days.    Part of this note may be an electronic transcription/translation of spoken language to printed text using the Dragon dictation system.         Electronically  signed by Jose Amaya MD, 08/05/23, 12:03 PM EDT.

## 2023-08-05 NOTE — PLAN OF CARE
Goal Outcome Evaluation:  Plan of Care Reviewed With: patient  Patient Agreement with Plan of Care: agrees  PATIENT ALERT AND ORIENTED AND COOPERATIVE WITH MEDICATIONS. PATIENT  DENIES S/I, H/I OR HALLUCINATIONS. PATIENT HAS BEEN OUT OF ROOM MORE TODAY INTERACTING WITH PEERS. NO INAPPROPRIATE OR AGGRESSIVE BEHAVIOR NOTED. WILL CONTINUE TO MONITOR FOR CHANGES IN MOOD OR BEHAVIOR.

## 2023-08-05 NOTE — PLAN OF CARE
Goal Outcome Evaluation:  Plan of Care Reviewed With: patient  Patient Agreement with Plan of Care: agrees      Pt withdrawn to room, polite and pleasant upon approach. Pt BP high, reassessed and given night minipress dose. Pt rated depression 8. Endorsed thoughts of self harm but contracted for safety. Pt denied SI, HI, anxiety and AVH.

## 2023-08-06 RX ORDER — BUSPIRONE HYDROCHLORIDE 10 MG/1
20 TABLET ORAL 2 TIMES DAILY
Status: DISCONTINUED | OUTPATIENT
Start: 2023-08-06 | End: 2023-08-08 | Stop reason: HOSPADM

## 2023-08-06 RX ADMIN — PRAZOSIN HYDROCHLORIDE 1 MG: 1 CAPSULE ORAL at 22:20

## 2023-08-06 RX ADMIN — BUSPIRONE HYDROCHLORIDE 20 MG: 10 TABLET ORAL at 22:20

## 2023-08-06 RX ADMIN — NICOTINE POLACRILEX 2 MG: 2 LOZENGE ORAL at 17:28

## 2023-08-06 RX ADMIN — METRONIDAZOLE 500 MG: 500 TABLET ORAL at 22:20

## 2023-08-06 RX ADMIN — HYDROXYZINE PAMOATE 75 MG: 25 CAPSULE ORAL at 22:27

## 2023-08-06 RX ADMIN — NICOTINE POLACRILEX 2 MG: 2 LOZENGE ORAL at 19:30

## 2023-08-06 RX ADMIN — METRONIDAZOLE 500 MG: 500 TABLET ORAL at 15:38

## 2023-08-06 RX ADMIN — VENLAFAXINE HYDROCHLORIDE 75 MG: 75 CAPSULE, EXTENDED RELEASE ORAL at 08:41

## 2023-08-06 RX ADMIN — METRONIDAZOLE 500 MG: 500 TABLET ORAL at 08:41

## 2023-08-06 RX ADMIN — NICOTINE POLACRILEX 2 MG: 2 LOZENGE ORAL at 22:27

## 2023-08-06 NOTE — PLAN OF CARE
Goal Outcome Evaluation:  Plan of Care Reviewed With: patient  Patient Agreement with Plan of Care: agrees      Pt has been up to dayroom, makes needs known. Pt rated anxiety 9, denied depression, avh, si, hi. Med compliant. Mood stable. Pt mentioned that she would be talking to provider about getting put back on Buspar.

## 2023-08-06 NOTE — PROGRESS NOTES
" [facility]     Psychiatric Progress Note    Patient Name: Cristina Estevez  : 1995  MRN: [id number]  Primary Care Physician:  Provider, No Known  Date of admission: 8/3/2023    Subjective   Subjective     Patient seen and chart reviewed, discussed with staff.    Chief Complaint: Depression        HPI:     Staff reports the patient rated her anxiety as a 9.  Denies suicidal or homicidal ideation.  She slept well last night.  She is noted to be somewhat attention seeking continues to act helpless at times.  Has a lack of motivation difficult to engage in care.    Patient today reports that she slept okay.  She reports that her nerves are bad minutes due to the environment on the unit.  She reports that she lets things to get on her nerves including peers behavior.  Patient reports having anxiety.    Patient also states that she has paranoid delusions and thinks that people are out to harm her or poison her and she does not know why she has these thoughts.  She also states that she does not know who would want to do this.  She is denying any suicidal or homicidal ideation.    States that she previously been on Risperdal buspirone and ask about potentially being back on these medications.  She reports the buspirone helped in the past, but she did not think the Risperdal helped.  We will initiate buspirone for her anxiety.      Objective   Objective     Vitals:   Temp:  [97.4 øF (36.3 øC)] 97.4 øF (36.3 øC)  Heart Rate:  [78-82] 78  Resp:  [18] 18  BP: ()/(69-97) 85/69          Mental Status Exam:      Appearance:   Calm, cooperative, withdrawn to her room, participates in exam, no psychomotor restlessness or agitation  Reliability:   Fair  Eye Contact:   Good  Concentration/Focus:    Attentive day interview  Behaviors:    No restlessness or agitation  Memory :    Sensorium intact, no deficits  Speech:    Normal rate and volume  Language:   Appropriate, relevant  Mood :    \"Anxious\"   Affect:    euthymic but " blunted  Thought process:    Linear, goal directed, no thought disorder disorganization noted  Thought Content:    Denies suicidal or homicidal ideation and denies any hallucinations and none evident  Insight:   Limited  Judgement:    Intact, no behavioral disturbance      Result Review    Result Review:  I have personally reviewed the results from the time of this admission to 8/6/2023 14:28 EDT and agree with these findings:  []  Laboratory  []  Microbiology  []  Radiology  []  EKG/Telemetry   []  Cardiology/Vascular   []  Pathology  []  Old records  []  Other:  Most notable findings include:     Lab Results (last 24 hours)       ** No results found for the last 24 hours. **                Medications:   metroNIDAZOLE, 500 mg, Oral, TID  prazosin, 1 mg, Oral, Nightly  venlafaxine XR, 75 mg, Oral, Daily With Breakfast          Assessment / Plan       Active Hospital Problems:  Active Hospital Problems    Diagnosis     **Major depressive disorder, recurrent episode, moderate     Post traumatic stress disorder (PTSD)     Alcohol abuse     Methamphetamine abuse     Class 3 severe obesity with body mass index (BMI) of 45.0 to 49.9 in adult     Borderline intellectual functioning     Urogenital infection by trichomonas vaginalis        Plan:     Initiate buspirone 20 mg twice daily  Continue other meds as ordered and work on mood stabilization  Encourage patient to get up and out the milieu and participate  For back to [facility] when stable  Work on mood stabilization and abatement of any suicidal ideation or psychosis.  Work on appropriate safety plan  Continue supportive therapy  Patient to engage in all group and individual treatment modalities available on the unit  Obtain collateral information if possible  Titrate medications as clinically indicated  Work on appropriate disposition follow-up including referrals to substance abuse treatment if indicated      Disposition:  I expect patient to be discharged 1  to 2 days.    Part of this note may be an electronic transcription/translation of spoken language to printed text using the Dragon dictation system.         Electronically signed by Jose Amaya MD, 08/06/23, 2:28 PM EDT.

## 2023-08-06 NOTE — PLAN OF CARE
Goal Outcome Evaluation:  Plan of Care Reviewed With: patient  Patient Agreement with Plan of Care: agrees   PATIENT ALERT AND ORIENTED AND COMPLIANT WITH MEDICATIONS. PATIENT DENIES S/I, H/I OR HALLUCINATIONS. PATIENT HAS BEEN WITHDRAWN TO HER ROOM ALL DAY SLEEPING. WILL CONTINUE TO MONITOR FOR CHANGES IN MOOD OR BEHAVIOR.

## 2023-08-07 RX ADMIN — NICOTINE POLACRILEX 2 MG: 2 LOZENGE ORAL at 13:18

## 2023-08-07 RX ADMIN — METRONIDAZOLE 500 MG: 500 TABLET ORAL at 08:31

## 2023-08-07 RX ADMIN — BUSPIRONE HYDROCHLORIDE 20 MG: 10 TABLET ORAL at 08:31

## 2023-08-07 RX ADMIN — METRONIDAZOLE 500 MG: 500 TABLET ORAL at 21:00

## 2023-08-07 RX ADMIN — METRONIDAZOLE 500 MG: 500 TABLET ORAL at 16:33

## 2023-08-07 RX ADMIN — BUSPIRONE HYDROCHLORIDE 20 MG: 10 TABLET ORAL at 21:00

## 2023-08-07 RX ADMIN — HYDROXYZINE PAMOATE 50 MG: 50 CAPSULE ORAL at 21:07

## 2023-08-07 RX ADMIN — PRAZOSIN HYDROCHLORIDE 1 MG: 1 CAPSULE ORAL at 21:01

## 2023-08-07 RX ADMIN — VENLAFAXINE HYDROCHLORIDE 75 MG: 75 CAPSULE, EXTENDED RELEASE ORAL at 08:31

## 2023-08-07 NOTE — PLAN OF CARE
Goal Outcome Evaluation:  Plan of Care Reviewed With: patient  Patient Agreement with Plan of Care: agrees           Patient calm and cooperative. Denies SI/HI/AVH. Rates anxiety 8, depression 0. Withdrawn to room sleeping most of the day. Compliant with medications.

## 2023-08-07 NOTE — PLAN OF CARE
Goal Outcome Evaluation:  Plan of Care Reviewed With: patient  Patient Agreement with Plan of Care: agrees         Pt is alert and oriented and able to make needs known.  Pt denies SI and HI.  Pt denies a/v/h.  Pt has been compliant with all meds and assessments tonight.  Pt participated in even group and engages well with staff and peers.  Pt reports that her coping skill is meth and understands that she needs to work on finding better ways to cope in order to be successful in her recovery.  Pt had a brief verbal altercation with peer stating that the peer is the reason she is here.  Pt reports that after her initial 28 days she and said peer are expected to complete the next 6 month period of recovery together and this worries her.  No s/s of acute distress observed at this time.

## 2023-08-07 NOTE — PROGRESS NOTES
" [facility]     Psychiatric Progress Note    Patient Name: Cristina Estevez  : 1995  MRN: [id number]  Primary Care Physician:  Provider, No Known  Date of admission: 8/3/2023    Subjective   Subjective     Patient seen and chart reviewed, discussed with staff.    Chief Complaint: Depression, anxiety      HPI:     Staff reports the patient has remained regressed nicely to her room.  Sleeping most of the day.  She was able to sleep well last night.  Was up out of her room for a short amount of time yesterday.  [facility].    The patient today states that she is tired and when asked why she states she does not know.  She reports that she slept well for the first time since she has been here in the hospital, but she has done nothing but sleep since the time of her admission and not sure how she is deriving her history of poor sleep.  She denies everything day including suicide homicidal ideation.  She has no side effects of medications.  When asked to describe her mood she responds, \"anxious\" from a prone position in her bed lying very calmly with no noticeable restlessness or agitation and has her eyes closed, no tremor, no shakiness to her voice, respirations even unlabored, and in no distress.  As described under the second time she again responded \"anxious\", tell her that that may be a symptom but does not adequately describe her mood and as specifically she is depressed she responds, \"no, I am fine with that.\"      Objective   Objective     Vitals:   Temp:  [98.1 øF (36.7 øC)] 98.1 øF (36.7 øC)  Heart Rate:  [80-93] 93  Resp:  [18] 18  BP: (128-141)/(61-77) 128/61          Mental Status Exam:      Appearance:   Bed, somewhat difficult to engage, appears quite uninterested in treatment  Reliability:   Poor  Eye Contact:   Limited  Concentration/Focus:    Minimally engaging, seems annoyed to have to answer questions  Behaviors:    Withdrawn to her room  Memory :    Intact  Speech:    " Minimal, normal rate and volume  Language:   Appropriate, relevant  Mood :    Describes feeling anxious, but denies depression  Affect:    Euthymic  Thought process:    Goal directed  Thought Content:    Denies suicidal or homicidal ideation, no evidence of hallucinations  Insight:   Limited  Judgement:    Impaired, not engaged in treatment but no behavioral disturbance      Result Review    Result Review:  I have personally reviewed the results from the time of this admission to 8/7/2023 14:52 EDT and agree with these findings:  []  Laboratory  []  Microbiology  []  Radiology  []  EKG/Telemetry   []  Cardiology/Vascular   []  Pathology  []  Old records  []  Other:  Most notable findings include:     Lab Results (last 24 hours)       ** No results found for the last 24 hours. **                Medications:   busPIRone, 20 mg, Oral, BID  metroNIDAZOLE, 500 mg, Oral, TID  prazosin, 1 mg, Oral, Nightly  venlafaxine XR, 75 mg, Oral, Daily With Breakfast          Assessment / Plan       Active Hospital Problems:  Active Hospital Problems    Diagnosis     **Major depressive disorder, recurrent episode, moderate     Post traumatic stress disorder (PTSD)     Alcohol abuse     Methamphetamine abuse     Class 3 severe obesity with body mass index (BMI) of 45.0 to 49.9 in adult     Borderline intellectual functioning     Urogenital infection by trichomonas vaginalis        Plan:     Continue current treatment protocol  Despite discharge tomorrow to [facility] house  Work on mood stabilization and abatement of any suicidal ideation or psychosis.  Work on appropriate safety plan  Continue supportive therapy  Patient to engage in all group and individual treatment modalities available on the unit  Obtain collateral information if possible  Titrate medications as clinically indicated  Work on appropriate disposition follow-up including referrals to substance abuse treatment if indicated      Disposition:  I expect patient to be  discharged tomorrow.    Part of this note may be an electronic transcription/translation of spoken language to printed text using the Dragon dictation system.         Electronically signed by Jose Amaya MD, 08/07/23, 2:52 PM EDT.

## 2023-08-08 VITALS
SYSTOLIC BLOOD PRESSURE: 127 MMHG | RESPIRATION RATE: 20 BRPM | TEMPERATURE: 98.2 F | WEIGHT: 280.43 LBS | OXYGEN SATURATION: 97 % | DIASTOLIC BLOOD PRESSURE: 66 MMHG | HEART RATE: 74 BPM | HEIGHT: 64 IN | BODY MASS INDEX: 47.88 KG/M2

## 2023-08-08 RX ORDER — VENLAFAXINE HYDROCHLORIDE 75 MG/1
75 CAPSULE, EXTENDED RELEASE ORAL
Qty: 30 CAPSULE | Refills: 1 | Status: SHIPPED | OUTPATIENT
Start: 2023-08-09

## 2023-08-08 RX ORDER — BUSPIRONE HYDROCHLORIDE 30 MG/1
30 TABLET ORAL 2 TIMES DAILY
Qty: 60 TABLET | Refills: 1 | Status: SHIPPED | OUTPATIENT
Start: 2023-08-08

## 2023-08-08 RX ADMIN — METRONIDAZOLE 500 MG: 500 TABLET ORAL at 08:47

## 2023-08-08 RX ADMIN — BUSPIRONE HYDROCHLORIDE 20 MG: 10 TABLET ORAL at 08:47

## 2023-08-08 RX ADMIN — VENLAFAXINE HYDROCHLORIDE 75 MG: 75 CAPSULE, EXTENDED RELEASE ORAL at 08:47

## 2023-08-08 NOTE — PLAN OF CARE
Goal Outcome Evaluation:  Plan of Care Reviewed With: patient  Patient Agreement with Plan of Care: agrees     Progress: improving     Patient calm and cooperative. Reports having suicidal thoughts but denies plan or intent. Denies homicidal ideation and a/v hallucinations. Rates anxiety and depression 10. Patient being discharged back to [facility] house today. Safety plan completed by patient prior to discharge.

## 2023-08-08 NOTE — PLAN OF CARE
"Goal Outcome Evaluation:  Plan of Care Reviewed With: patient  Patient Agreement with Plan of Care: agrees  Patient has been withdrawn to her room resting and sleeping in bed. Pt reports she has been in bed most of the day. Reports suicidal ideations,no plan verbalized. Pt ask if she can come to staff should she have thoughts and intent to harm herself and she agreed she can. Pt then stated \" I can't promise that I wont harm myself if I leave here tomorrow\".Pt then went to other nurse on duty and states she is tired of doing rehab and it never working for her.Pt rated depression and anxiety 10/10.Pt given vistaril per her request for anxiety. Treatment plan reviewed and is ongoing                  "

## 2023-08-08 NOTE — DISCHARGE SUMMARY
[facility]         DISCHARGE SUMMARY    Patient Name: Cristina Estevez  : 1995  MRN: [id number]    Date of Admission: 8/3/2023  Date of Discharge: 2023  Primary Care Physician: Provider, No Known    Consults       No orders found from 2023 to 2023.            Presenting Problem:   Major depressive disorder, recurrent episode, moderate [F33.1]    Active and Resolved Hospital Problems:  Active Hospital Problems    Diagnosis POA    **Major depressive disorder, recurrent episode, moderate [F33.1] Yes    Post traumatic stress disorder (PTSD) [F43.10] Yes    Alcohol abuse [F10.10] Yes    Methamphetamine abuse [F15.10] Yes    Class 3 severe obesity with body mass index (BMI) of 45.0 to 49.9 in adult [E66.01, Z68.42] Not Applicable    Borderline intellectual functioning [R41.83] Not Applicable    Urogenital infection by trichomonas vaginalis [A59.00] Yes      Resolved Hospital Problems   No resolved problems to display.         Hospital Course     Hospital Course:  Cristina Estevez is a 27 y.o. female with a history of depression and PTSD as well as substance use that was admitted from the emergency room on a voluntary basis for suicidal ideation.  Patient been at [facility] for treatment of her substance use disorder.  Patient had some conflict with peers at [facility] could not handle this became suicidal.    Patient been treated for depression and anxiety prior to coming into the hospital.  Her medications were not effective and sertraline was discontinued.  She was started on venlafaxine for her depression and has tolerated this medication well with no side effects.  She reports it has helped her mood.    She had been on buspirone and this initially was not reinitiated.  However, she complained of anxiety throughout her stay buspirone was reinitiated at a higher dose, and then titrated up to 30 mg twice daily by time of discharge.  Patient reports medication has helped.    Patient  "has been mostly isolative and regressed to her room throughout her stay.  Patient has been sleeping a lot and is not been fully engaged in treatment process.  She has been a little guarded and difficult to engage, but is future and goal directed.  Her suicidal ideations dissipated.    Patient is not fully engaged in the therapeutic process here on the unit.  She has had medication changes to help with her depression which will take some time to be fully effective.  Patient also needs treatment for her history of significant substance use and is able to return to the [facility] for continued treatment for drugs and alcohol.  Patient said no acute agitation while in the hospital.  At this point she is on a good medication regimen and has an appropriate disposition follow-up for continued sobriety.  The patient is going to be managed as an outpatient is met maximum benefit of hospital and may be discharged home.    On day of discharge she is calm, cooperative, engaging, and has no psychomotor restlessness or agitation.  She is future oriented goal directed.  Speech is articulate, fluent, normal rate and volume, but somewhat limited.  Language is appropriate relevant.  Mood is described as \"guess I am alright\" affect is slightly blunted but euthymic.  Thought processes are sequential and goal directed.  Thought content is negative for suicidal or homicidal ideation or auditory visualizations.  Insight and judgment are limited as she seems very poorly motivated for treatment here and suspect this has been her presentation to [facility].      DISCHARGE Follow Up Recommendations for labs and diagnostics: Primary care as needed for general health maintenance, [facility] for drug and alcohol rehabilitation, [facility]      Day of Discharge     Vital Signs:  Temp:  [98.2 øF (36.8 øC)-98.4 øF (36.9 øC)] 98.2 øF (36.8 øC)  Heart Rate:  [74-91] 74  Resp:  [18-20] 20  BP: (127-132)/(66-82) 127/66      Pertinent "  and/or Most Recent Results     LAB RESULTS:      Lab 08/03/23 2001   WBC 9.12   HEMOGLOBIN 14.8   HEMATOCRIT 48.8*   PLATELETS 258   NEUTROS ABS 5.21   IMMATURE GRANS (ABS) 0.03   LYMPHS ABS 3.16*   MONOS ABS 0.60   EOS ABS 0.09   MCV 93.7         Lab 08/03/23 2001   SODIUM 138   POTASSIUM 4.4   CHLORIDE 104   CO2 21.2*   ANION GAP 12.8   BUN 14   CREATININE 0.91   EGFR 88.9   GLUCOSE 130*   CALCIUM 9.9         Lab 08/03/23 2001   TOTAL PROTEIN 7.0   ALBUMIN 3.9   GLOBULIN 3.1   ALT (SGPT) 51*   AST (SGOT) 34*   BILIRUBIN 0.2   ALK PHOS 80                                     Lab 08/03/23 2001   ETHANOL PCT <0.010   ETHANOL MGDL <10         Lab 08/03/23 2005   AMPH/METHAM SCREEN, URINE Negative   BENZODIAZEPINE SCREEN, URINE Negative   COCAINE SCREEN, URINE Negative   OPIATES Negative   THC URINE SCREEN Negative   METHADONE SCREEN, URINE Negative     Brief Urine Lab Results  (Last result in the past 365 days)        Color   Clarity   Blood   Leuk Est   Nitrite   Protein   CREAT   Urine HCG        08/03/23 2005               Negative                                   Imaging Results (Last 7 Days)       ** No results found for the last 168 hours. **             Labs Pending at Discharge:           Discharge Details        Discharge Medications        New Medications        Instructions Start Date   venlafaxine XR 75 MG 24 hr capsule  Commonly known as: EFFEXOR-XR   75 mg, Oral, Daily With Breakfast   Start Date: August 9, 2023            Changes to Medications        Instructions Start Date   busPIRone 30 MG tablet  Commonly known as: BUSPAR  What changed:   medication strength  how much to take  when to take this   30 mg, Oral, 2 Times Daily             Continue These Medications        Instructions Start Date   metroNIDAZOLE 500 MG tablet  Commonly known as: FLAGYL   500 mg, Oral, 3 Times Daily      prazosin 1 MG capsule  Commonly known as: MINIPRESS   1 mg, Oral, Nightly             Stop These Medications       ibuprofen 200 MG tablet  Commonly known as: ADVIL,MOTRIN     risperiDONE 2 MG tablet  Commonly known as: risperDAL     sertraline 50 MG tablet  Commonly known as: ZOLOFT              No Known Allergies      Discharge Disposition:  Home or Self Care    Diet:  Hospital:  Diet Order   Procedures    Diet: Regular/House Diet; Texture: Regular Texture (IDDSI 7); Fluid Consistency: Thin (IDDSI 0)         Discharge Activity: Ad keaton.  Activity Instructions       Activity as Tolerated              Discharge Condition: Stable    CODE STATUS:  Code Status and Medical Interventions:   Ordered at: 08/03/23 2135     Code Status (Patient has no pulse and is not breathing):    CPR (Attempt to Resuscitate)     Medical Interventions (Patient has pulse or is breathing):    Full Support     Release to patient:    Routine Release         No future appointments.    Additional Instructions for the Follow-ups that You Need to Schedule       Discharge Follow-up with PCP   As directed       Currently Documented PCP:    Provider, No Known    PCP Phone Number:    None     Follow Up Details: As needed        Discharge Follow-up with Specified Provider: [facility]   As directed      To[facility]        Discharge Follow-up with Specified Provider: [facility]   As directed      To: [facility]                Time spent on Discharge including face to face service: 30 minutes    Part of this note may be an electronic transcription/translation of spoken language to printed text using the Dragon dictation system.        Electronically signed by Jose Amaya MD, 08/08/23, 9:52 AM EDT.

## 2023-08-09 ENCOUNTER — HOSPITAL ENCOUNTER (EMERGENCY)
Facility: HOSPITAL | Age: 28
Discharge: OTHER INSTITUTION | End: 2023-08-09
Attending: EMERGENCY MEDICINE
Payer: MEDICAID

## 2023-08-09 VITALS
RESPIRATION RATE: 16 BRPM | HEIGHT: 64 IN | DIASTOLIC BLOOD PRESSURE: 55 MMHG | BODY MASS INDEX: 48.48 KG/M2 | TEMPERATURE: 98 F | HEART RATE: 72 BPM | WEIGHT: 283.95 LBS | OXYGEN SATURATION: 99 % | SYSTOLIC BLOOD PRESSURE: 122 MMHG

## 2023-08-09 DIAGNOSIS — R45.851 DEPRESSION WITH SUICIDAL IDEATION: Primary | ICD-10-CM

## 2023-08-09 DIAGNOSIS — F32.A DEPRESSION WITH SUICIDAL IDEATION: Primary | ICD-10-CM

## 2023-08-09 LAB
ALBUMIN SERPL-MCNC: 3.7 G/DL (ref 3.5–5.2)
ALBUMIN/GLOB SERPL: 1.4 G/DL
ALP SERPL-CCNC: 69 U/L (ref 39–117)
ALT SERPL W P-5'-P-CCNC: 43 U/L (ref 1–33)
AMPHET+METHAMPHET UR QL: NEGATIVE
ANION GAP SERPL CALCULATED.3IONS-SCNC: 12.6 MMOL/L (ref 5–15)
APAP SERPL-MCNC: <5 MCG/ML (ref 0–30)
AST SERPL-CCNC: 30 U/L (ref 1–32)
BARBITURATES UR QL SCN: NEGATIVE
BASOPHILS # BLD AUTO: 0.02 10*3/MM3 (ref 0–0.2)
BASOPHILS NFR BLD AUTO: 0.2 % (ref 0–1.5)
BENZODIAZ UR QL SCN: NEGATIVE
BILIRUB SERPL-MCNC: 0.2 MG/DL (ref 0–1.2)
BUN SERPL-MCNC: 9 MG/DL (ref 6–20)
BUN/CREAT SERPL: 13.8 (ref 7–25)
CALCIUM SPEC-SCNC: 9 MG/DL (ref 8.6–10.5)
CANNABINOIDS SERPL QL: NEGATIVE
CHLORIDE SERPL-SCNC: 106 MMOL/L (ref 98–107)
CO2 SERPL-SCNC: 21.4 MMOL/L (ref 22–29)
COCAINE UR QL: NEGATIVE
CREAT SERPL-MCNC: 0.65 MG/DL (ref 0.57–1)
DEPRECATED RDW RBC AUTO: 42.5 FL (ref 37–54)
EGFRCR SERPLBLD CKD-EPI 2021: 123.9 ML/MIN/1.73
EOSINOPHIL # BLD AUTO: 0.15 10*3/MM3 (ref 0–0.4)
EOSINOPHIL NFR BLD AUTO: 1.5 % (ref 0.3–6.2)
ERYTHROCYTE [DISTWIDTH] IN BLOOD BY AUTOMATED COUNT: 13.7 % (ref 12.3–15.4)
ETHANOL BLD-MCNC: <10 MG/DL (ref 0–10)
ETHANOL UR QL: <0.01 %
FENTANYL UR-MCNC: NEGATIVE NG/ML
GLOBULIN UR ELPH-MCNC: 2.7 GM/DL
GLUCOSE SERPL-MCNC: 107 MG/DL (ref 65–99)
HCT VFR BLD AUTO: 41.5 % (ref 34–46.6)
HGB BLD-MCNC: 14.1 G/DL (ref 12–15.9)
HOLD SPECIMEN: NORMAL
HOLD SPECIMEN: NORMAL
IMM GRANULOCYTES # BLD AUTO: 0.04 10*3/MM3 (ref 0–0.05)
IMM GRANULOCYTES NFR BLD AUTO: 0.4 % (ref 0–0.5)
LYMPHOCYTES # BLD AUTO: 3.49 10*3/MM3 (ref 0.7–3.1)
LYMPHOCYTES NFR BLD AUTO: 35.3 % (ref 19.6–45.3)
MCH RBC QN AUTO: 28.7 PG (ref 26.6–33)
MCHC RBC AUTO-ENTMCNC: 34 G/DL (ref 31.5–35.7)
MCV RBC AUTO: 84.5 FL (ref 79–97)
METHADONE UR QL SCN: NEGATIVE
MONOCYTES # BLD AUTO: 0.83 10*3/MM3 (ref 0.1–0.9)
MONOCYTES NFR BLD AUTO: 8.4 % (ref 5–12)
NEUTROPHILS NFR BLD AUTO: 5.37 10*3/MM3 (ref 1.7–7)
NEUTROPHILS NFR BLD AUTO: 54.2 % (ref 42.7–76)
NRBC BLD AUTO-RTO: 0 /100 WBC (ref 0–0.2)
OPIATES UR QL: NEGATIVE
OXYCODONE UR QL SCN: NEGATIVE
PLATELET # BLD AUTO: 302 10*3/MM3 (ref 140–450)
PMV BLD AUTO: 9.6 FL (ref 6–12)
POTASSIUM SERPL-SCNC: 3.8 MMOL/L (ref 3.5–5.2)
PROT SERPL-MCNC: 6.4 G/DL (ref 6–8.5)
RBC # BLD AUTO: 4.91 10*6/MM3 (ref 3.77–5.28)
SALICYLATES SERPL-MCNC: <0.3 MG/DL
SODIUM SERPL-SCNC: 140 MMOL/L (ref 136–145)
WBC NRBC COR # BLD: 9.9 10*3/MM3 (ref 3.4–10.8)
WHOLE BLOOD HOLD COAG: NORMAL
WHOLE BLOOD HOLD SPECIMEN: NORMAL

## 2023-08-09 PROCEDURE — 80307 DRUG TEST PRSMV CHEM ANLYZR: CPT | Performed by: EMERGENCY MEDICINE

## 2023-08-09 PROCEDURE — 80179 DRUG ASSAY SALICYLATE: CPT

## 2023-08-09 PROCEDURE — 96360 HYDRATION IV INFUSION INIT: CPT

## 2023-08-09 PROCEDURE — 80143 DRUG ASSAY ACETAMINOPHEN: CPT

## 2023-08-09 PROCEDURE — 82077 ASSAY SPEC XCP UR&BREATH IA: CPT

## 2023-08-09 PROCEDURE — 99285 EMERGENCY DEPT VISIT HI MDM: CPT

## 2023-08-09 PROCEDURE — 36415 COLL VENOUS BLD VENIPUNCTURE: CPT

## 2023-08-09 PROCEDURE — 85025 COMPLETE CBC W/AUTO DIFF WBC: CPT

## 2023-08-09 PROCEDURE — 80053 COMPREHEN METABOLIC PANEL: CPT

## 2023-08-09 RX ORDER — SODIUM CHLORIDE 0.9 % (FLUSH) 0.9 %
10 SYRINGE (ML) INJECTION AS NEEDED
Status: DISCONTINUED | OUTPATIENT
Start: 2023-08-09 | End: 2023-08-10 | Stop reason: HOSPADM

## 2023-08-09 NOTE — ED PROVIDER NOTES
Time: 10:27 AM EDT  Date of encounter:  8/9/2023  Independent Historian/Clinical History and Information was obtained by:   Patient    History is limited by: N/A    Chief Complaint: Depression with suicidal ideation      History of Present Illness:  Patient is a 27 y.o. year old female who presents to the emergency department for evaluation of depression with suicidal ideation.  Patient has history of PTSD, borderline personality disorder, anxiety, depression and substance abuse.  Patient is currently at [facility] for substance abuse rehab treatment.  She also recently had an inpatient psychiatric admission where she had her medications adjusted.  Patient is feeling more depressed and having suicidal thoughts.  She has been thinking Is to kill herself.  She states her medication is not helping.    HPI    Patient Care Team  Primary Care Provider: Provider, No Known    Past Medical History:     No Known Allergies  Past Medical History:   Diagnosis Date    Anxiety     Borderline personality disorder     Depression     Fibromyalgia     Obesity     PTSD (post-traumatic stress disorder)      No past surgical history on file.  Family History   Problem Relation Age of Onset    Diabetes Mother     Bipolar disorder Mother     Suicidality Mother         Took her own life on December 1, 2011    Cancer Father     Depression Father     Alcohol abuse Father     Drug abuse Father     Diabetes Maternal Grandmother     Alcohol abuse Maternal Grandfather     Diabetes Paternal Grandfather        Home Medications:  Prior to Admission medications    Medication Sig Start Date End Date Taking? Authorizing Provider   busPIRone (BUSPAR) 30 MG tablet Take 1 tablet by mouth 2 (Two) Times a Day. Indications: Anxiety Disorder, Major Depressive Disorder 8/8/23   Jose Amaya MD   metroNIDAZOLE (FLAGYL) 500 MG tablet Take 1 tablet by mouth 3 (Three) Times a Day. 7/28/23   Adiel Ayala APRN   prazosin (MINIPRESS) 1 MG capsule Take  "1 capsule by mouth Every Night. 7/28/23   Provider, MD Yuri   venlafaxine XR (EFFEXOR-XR) 75 MG 24 hr capsule Take 1 capsule by mouth Daily With Breakfast. Indications: Major Depressive Disorder 8/9/23   Jose Amaya MD        Social History:   Social History     Tobacco Use    Smoking status: Every Day     Packs/day: 0.50     Years: 10.00     Pack years: 5.00     Types: Cigarettes    Smokeless tobacco: Never   Vaping Use    Vaping Use: Former   Substance Use Topics    Alcohol use: Yes     Comment: Last drink was on July 22, 2023    Drug use: Not Currently     Types: Methamphetamines, Marijuana         Review of Systems:  Review of Systems   Psychiatric/Behavioral:  Positive for dysphoric mood and suicidal ideas.       Physical Exam:  /55 (BP Location: Right arm, Patient Position: Lying)   Pulse 72   Temp 98 øF (36.7 øC) (Oral)   Resp 16   Ht 162.6 cm (64\")   Wt 129 kg (283 lb 15.2 oz)   LMP  (LMP Unknown)   SpO2 99%   BMI 48.74 kg/mý     Physical Exam  Vitals and nursing note reviewed.   Constitutional:       General: She is not in acute distress.     Appearance: Normal appearance.   HENT:      Head: Normocephalic.   Cardiovascular:      Rate and Rhythm: Normal rate and regular rhythm.   Pulmonary:      Effort: Pulmonary effort is normal. No respiratory distress.      Breath sounds: Normal breath sounds.   Abdominal:      Palpations: Abdomen is soft.      Tenderness: There is no abdominal tenderness.   Musculoskeletal:         General: Normal range of motion.      Cervical back: Normal range of motion.   Skin:     General: Skin is warm and dry.   Neurological:      Mental Status: She is alert and oriented to person, place, and time. Mental status is at baseline.   Psychiatric:         Attention and Perception: Attention normal.         Mood and Affect: Mood is depressed.         Speech: Speech normal.         Behavior: Behavior normal. Behavior is cooperative.         Thought Content: Thought " content includes suicidal ideation. Thought content includes suicidal plan.         Cognition and Memory: Cognition normal.         Judgment: Judgment normal.                Procedures:  Procedures      Medical Decision Making:      Comorbidities that affect care:    Borderline personality disorder, PTSD, anxiety, depression, substance abuse    External Notes reviewed:    None      The following orders were placed and all results were independently analyzed by me:  Orders Placed This Encounter   Procedures    Moran Draw    Comprehensive Metabolic Panel    Acetaminophen Level    Ethanol    Salicylate Level    Urine Drug Screen - Urine, Clean Catch    CBC Auto Differential    Continuous Pulse Oximetry    Vital Signs    Undress & Gown    POC Glucose Once    CBC & Differential    Green Top (Gel)    Lavender Top    Gold Top - SST    Light Blue Top       Medications Given in the Emergency Department:  Medications - No data to display       ED Course:         Labs:    Lab Results (last 24 hours)       ** No results found for the last 24 hours. **             Imaging:    No Radiology Exams Resulted Within Past 24 Hours      Differential Diagnosis and Discussion:    Psychiatric: Differential diagnosis includes but is not limited to depression, psychosis, bipolar disorder, anxiety, manic episode, schizophrenia, and substance abuse.    All labs were reviewed and interpreted by me.    MDM     Patient was evaluated by [facility]  who recommends higher level of care.      Patient Care Considerations:          Consultants/Shared Management Plan:    Patient evaluated by [facility]     Social Determinants of Health:    Patient is independent, reliable, and has access to care.       Disposition and Care Coordination:    Transferred: Through independent evaluation of the patient's history, physical, and imperical data, the patient meets criteria to be transferred to another hospital for  evaluation/admission.        Final diagnoses:   Depression with suicidal ideation        ED Disposition       ED Disposition   Transfer to Another Facility     Condition   --    Comment   --               This medical record created using voice recognition software.             Eliazar Flood DO  08/15/23 3525

## 2023-08-09 NOTE — SIGNIFICANT NOTE
08/09/23 7129   Plan   Plan Comments SW sent referral to [facility] for inpatient psychiatric treatment. SW to await acceptance or denial. Pt was administratively [facility] due to behavior.

## 2023-08-09 NOTE — SIGNIFICANT NOTE
08/09/23 1125   Plan   Plan [facility] provider request.

## 2023-08-10 NOTE — SIGNIFICANT NOTE
08/09/23 2039   Plan   Plan Comments Pt was accepted to [facility] for inpatient psychiatric treatment. Pt will be going to facility EMS.   Final Discharge Disposition Code 65 - psychiatric hospital or unit

## 2023-08-11 NOTE — PAYOR COMM NOTE
"SUBJECT:    Clinical information for REF#[id number] HOSPITAL TO HOSPITAL URGENT AMBULANCE TRANSPORTATION      PATIENT'S NAME:     Cristina Estevez     \[facility]\"" MRN:     [id number]     DOS:     8/9/2023    INSURANCE MEMBER ID:     [id number]        SERVICE PROVIDER  Company [facility]  NPI  [id number]   Tax ID  [id number]  Address [address] [place] [address]  Phone  [phone]  Fax  [phone]         CASE MANAGEMENT CONTACT INFORMATION  Name  Dilma Benedict             Phone  [phone]  Fax  [phone]    Cristina Estevez (27 y.o. Female)       Date of Birth   1995    Social Security Number       [place]   [address] [address] [address]    Home Phone   [phone]    MRN   [id number]       Pent[employer]ostal   None    Marital Status   Single                            Admission Date   8/9/23    Admission Type   Emergency    Admitting Provider       Attending Provider       Department, Room/Bed   [facility] EMERGENCY ROOM, 27/27       Discharge Date   8/9/2023    Discharge Disposition   Another Health Care Institution Not Defined    Discharge Destination   Behavioral Health                              Attending Provider: (none)   Allergies: No Known Allergies    Isolation: None   Infection: None   Code Status: Prior    Ht: 162.6 cm (64\")   Wt: 129 kg (283 lb 15.2 oz)    Admission Cmt: None   Principal Problem: None                  Active Insurance as of 8/9/2023       Primary Coverage       Payor Plan Insurance Group Employer/Plan Group    [facility] COMMUNITY PLAN [facility] COMMUNITY PLAN [facility]       Payor Plan Address Payor Plan Phone Number Payor Plan Fax Number Effective Dates    [address]   1/1/2023 - None Entered    [facility] [address]         Subscriber Name Subscriber Birth Date Member ID       CRISTINA ESTEVEZ 1995 [id number]                     Emergency Contacts        (Rel.) Home Phone Work Phone Mobile Phone    ANGELINA MONSIVAIS " (Grandparent) [phone] -- [phone]                        History & Physical    No notes of this type exist for this encounter.       Emergency Department Notes    No notes of this type exist for this encounter.         Physician Certification:    Reason for Transfer: Specialized equipment and/or services at receiving facility (List in comments)  Comment: Psychiatry       Potential risk of transfer: Displacement of IV or other tubes en route   Potential benefits of transfer: Availability of resources   Risks/benefits explained to:       Receiving facility notified and accepted patient, indicating capability and capacity to treat.    Accepting physician: TESS Streeter    Accepted date/time: 8/9/2023  8:40 PM   Transferring physician: Moise Jordan MD     Patient condition: The patient has been stabilized such that within reasonable medical probability, no deterioration of the patient's condition is likely to result from the transfer.    Physician certification: After examination of this patient, and based on information available at this time, the medical benefits reasonable expected from provision of appropriate treatment at the receiving facility, outweigh the increased risk, if any, to the individual's medical condition and, in the case of labor, to the unborn child's medical condition from the effecting transfer.       Nursing Documentation:    Accepting hospital: [facility]      Report given to: kyra feliz Report time: 8/9/2023  9:07 PM  Medications reviewed with next service provider:       Copies of medical records sent: History and Physical, Provider note, Nursing note, Transfer form, X-ray/diagnostic procedures   Belongings disposition: Sent with patient     Transport via: Ambulance     -  Level of Care: Basic Life Support (BLS)     Condition at transfer: Stable